# Patient Record
Sex: FEMALE | Race: WHITE | NOT HISPANIC OR LATINO | Employment: FULL TIME | URBAN - METROPOLITAN AREA
[De-identification: names, ages, dates, MRNs, and addresses within clinical notes are randomized per-mention and may not be internally consistent; named-entity substitution may affect disease eponyms.]

---

## 2017-01-11 ENCOUNTER — APPOINTMENT (OUTPATIENT)
Dept: WOMENS IMAGING | Facility: HOSPITAL | Age: 57
End: 2017-01-11

## 2017-01-11 PROCEDURE — 77067 SCR MAMMO BI INCL CAD: CPT | Performed by: RADIOLOGY

## 2018-02-08 ENCOUNTER — APPOINTMENT (OUTPATIENT)
Dept: WOMENS IMAGING | Facility: HOSPITAL | Age: 58
End: 2018-02-08

## 2018-02-08 PROCEDURE — 77067 SCR MAMMO BI INCL CAD: CPT | Performed by: RADIOLOGY

## 2019-02-22 ENCOUNTER — APPOINTMENT (OUTPATIENT)
Dept: WOMENS IMAGING | Facility: HOSPITAL | Age: 59
End: 2019-02-22

## 2019-02-22 PROCEDURE — 77067 SCR MAMMO BI INCL CAD: CPT | Performed by: RADIOLOGY

## 2020-06-05 ENCOUNTER — APPOINTMENT (OUTPATIENT)
Dept: WOMENS IMAGING | Facility: HOSPITAL | Age: 60
End: 2020-06-05

## 2020-06-05 PROCEDURE — 77067 SCR MAMMO BI INCL CAD: CPT | Performed by: RADIOLOGY

## 2022-01-07 ENCOUNTER — APPOINTMENT (OUTPATIENT)
Dept: WOMENS IMAGING | Facility: HOSPITAL | Age: 62
End: 2022-01-07

## 2022-01-07 PROCEDURE — 77067 SCR MAMMO BI INCL CAD: CPT | Performed by: RADIOLOGY

## 2022-01-07 PROCEDURE — 77063 BREAST TOMOSYNTHESIS BI: CPT | Performed by: RADIOLOGY

## 2022-02-02 ENCOUNTER — APPOINTMENT (OUTPATIENT)
Dept: WOMENS IMAGING | Facility: HOSPITAL | Age: 62
End: 2022-02-02

## 2022-02-02 PROCEDURE — 77065 DX MAMMO INCL CAD UNI: CPT | Performed by: RADIOLOGY

## 2022-02-02 PROCEDURE — 77061 BREAST TOMOSYNTHESIS UNI: CPT | Performed by: RADIOLOGY

## 2022-02-02 PROCEDURE — 76641 ULTRASOUND BREAST COMPLETE: CPT | Performed by: RADIOLOGY

## 2022-02-02 PROCEDURE — G0279 TOMOSYNTHESIS, MAMMO: HCPCS | Performed by: RADIOLOGY

## 2022-02-14 ENCOUNTER — APPOINTMENT (OUTPATIENT)
Dept: WOMENS IMAGING | Facility: HOSPITAL | Age: 62
End: 2022-02-14

## 2022-02-14 PROCEDURE — A4648 IMPLANTABLE TISSUE MARKER: HCPCS | Performed by: RADIOLOGY

## 2022-02-14 PROCEDURE — 19083 BX BREAST 1ST LESION US IMAG: CPT | Performed by: RADIOLOGY

## 2022-03-01 ENCOUNTER — NURSE NAVIGATOR (OUTPATIENT)
Dept: OTHER | Facility: HOSPITAL | Age: 62
End: 2022-03-01

## 2022-03-01 ENCOUNTER — OFFICE VISIT (OUTPATIENT)
Dept: SURGERY | Facility: CLINIC | Age: 62
End: 2022-03-01

## 2022-03-01 VITALS — BODY MASS INDEX: 31.72 KG/M2 | HEIGHT: 66 IN | WEIGHT: 197.4 LBS

## 2022-03-01 DIAGNOSIS — C50.919 MALIGNANT NEOPLASM OF FEMALE BREAST, UNSPECIFIED ESTROGEN RECEPTOR STATUS, UNSPECIFIED LATERALITY, UNSPECIFIED SITE OF BREAST: Primary | ICD-10-CM

## 2022-03-01 PROCEDURE — 99204 OFFICE O/P NEW MOD 45 MIN: CPT | Performed by: STUDENT IN AN ORGANIZED HEALTH CARE EDUCATION/TRAINING PROGRAM

## 2022-03-01 RX ORDER — ALPRAZOLAM 0.5 MG/1
0.5 TABLET ORAL 2 TIMES DAILY PRN
COMMUNITY
Start: 2022-02-12

## 2022-03-01 RX ORDER — GABAPENTIN 300 MG/1
1 CAPSULE ORAL 3 TIMES DAILY
Status: ON HOLD | COMMUNITY
Start: 2022-01-06 | End: 2022-04-01

## 2022-03-01 RX ORDER — LEVOTHYROXINE SODIUM 88 UG/1
88 TABLET ORAL DAILY
COMMUNITY
Start: 2022-02-10

## 2022-03-01 NOTE — PROGRESS NOTES
Referral received from Dr. Pinto's office. Met Ms. Tran, her daughter and  during her surgery consult. I introduced myself and navigational services. She has a good understanding of her pathology and treatment options presented to her by Dr. Pinto. After the consult she is leaning toward having a lumpectomy. She is comfortable with this plan and has no questions or concerns following the consult.     We discussed her support system and she stated she has wonderful support and no resource needs at this time.     We discussed integrative therapies and other services at the [facility]. I gave her a navigation folder with the following information: [facility] Cancer Support Network, Sharing Our Stories Breast Cancer Support Group, Cancer and Restorative Exercise (CARE), [facility], Together for Breast Cancer Survival, Guide for the Newly Diagnosed, Bioimpedance, [facility], Massage Therapy, Reiki Therapy, [employer] [place], Cancer Nutrition, and Survivorship Clinic.    She verbalized appreciation for navigational services and she has my contact information and will call with any questions that arise.

## 2022-03-02 ENCOUNTER — HOSPITAL ENCOUNTER (OUTPATIENT)
Dept: MRI IMAGING | Facility: HOSPITAL | Age: 62
Discharge: HOME | End: 2022-03-02
Admitting: STUDENT IN AN ORGANIZED HEALTH CARE EDUCATION/TRAINING PROGRAM

## 2022-03-02 DIAGNOSIS — C50.919 MALIGNANT NEOPLASM OF FEMALE BREAST, UNSPECIFIED ESTROGEN RECEPTOR STATUS, UNSPECIFIED LATERALITY, UNSPECIFIED SITE OF BREAST: ICD-10-CM

## 2022-03-02 PROCEDURE — 77049 MRI BREAST C-+ W/CAD BI: CPT

## 2022-03-02 PROCEDURE — 0 GADOBENATE DIMEGLUMINE 529 MG/ML SOLUTION: Performed by: STUDENT IN AN ORGANIZED HEALTH CARE EDUCATION/TRAINING PROGRAM

## 2022-03-02 PROCEDURE — A9577 INJ MULTIHANCE: HCPCS | Performed by: STUDENT IN AN ORGANIZED HEALTH CARE EDUCATION/TRAINING PROGRAM

## 2022-03-02 PROCEDURE — 82565 ASSAY OF CREATININE: CPT

## 2022-03-02 RX ADMIN — GADOBENATE DIMEGLUMINE 18 ML: 529 INJECTION, SOLUTION INTRAVENOUS at 11:20

## 2022-03-04 ENCOUNTER — TELEPHONE (OUTPATIENT)
Dept: SURGERY | Facility: CLINIC | Age: 62
End: 2022-03-04

## 2022-03-04 DIAGNOSIS — R91.8 LUNG MASS: Primary | ICD-10-CM

## 2022-03-04 NOTE — TELEPHONE ENCOUNTER
Discussed MRI results. No new findings in the breast/axilla. Plan for R breast wire localized lumpectomy/SLN biopsy.     Possible area of concern in the R lung with lymphadenopathy. Will obtain CT chest.     IMPRESSION AND RECOMMENDATION:  1.  Segmental 3.8 cm nonmass enhancement at 6:00 in the right breast  represents biopsy-proven malignancy. Surgical management is recommended.  2.  No MRI evidence of malignancy in the left breast.  3.  Suspected bilateral mediastinal and hilar lymphadenopathy and  incompletely assessed 1.3 cm nodular area of enhancement in the anterior  right lung. Recommend further evaluation with contrast-enhanced CT  chest.  BI-RADS Category 6: Known biopsy-proven malignancy    Sherrell Pinto MD

## 2022-03-07 ENCOUNTER — TELEPHONE (OUTPATIENT)
Dept: SURGERY | Facility: CLINIC | Age: 62
End: 2022-03-07

## 2022-03-07 NOTE — TELEPHONE ENCOUNTER
Hub staff attempted to follow warm transfer process and was unsuccessful     Caller: MAYURI VAUGHN    Relationship to patient: SHIRLEY    Best call back number: [phone]  Patient is needing: DAUGHTER MAYURI VAUGHN ALREADY SCHEDULED PT FOR A CTSCAN TODAY AT 11AM. SHE WILL FAX RESULTS WHEN SHE GETS THEM.   SHE WAS RETURNING A CALL FOR ZEFERINO OR JUVENCIO

## 2022-03-09 ENCOUNTER — TELEPHONE (OUTPATIENT)
Dept: SURGERY | Facility: CLINIC | Age: 62
End: 2022-03-09

## 2022-03-09 LAB — CREAT BLDA-MCNC: 1 MG/DL (ref 0.6–1.3)

## 2022-03-09 NOTE — TELEPHONE ENCOUNTER
Caller: MAYURI VAUGHN    Relationship: Child    Best call back number: [phone] (JUVENCIO'S #)    Caller requesting test results: CT SCAN    What test was performed: CT CHEST W/ CONTRAST    When was the test performed: 03/07/2022    Where was the test performed: [facility]    Additional notes: MAYURI VAUGHN (DAUGHTER) CALLED IN FOR HER MOTHER, SHE IS REQUESTING THAT HER MOTHER BE CALLED WITH THE TEST RESULTS OF THE CT SCAN SHE HAD DONE. PT IS AVAILABLE ANYTIME AND WOULD LIKE A VM IF SHE DOES NOT ANSWER.

## 2022-03-10 ENCOUNTER — PREP FOR SURGERY (OUTPATIENT)
Dept: OTHER | Facility: HOSPITAL | Age: 62
End: 2022-03-10

## 2022-03-10 ENCOUNTER — TELEPHONE (OUTPATIENT)
Dept: SURGERY | Facility: CLINIC | Age: 62
End: 2022-03-10

## 2022-03-10 DIAGNOSIS — C50.919 MALIGNANT NEOPLASM OF FEMALE BREAST, UNSPECIFIED ESTROGEN RECEPTOR STATUS, UNSPECIFIED LATERALITY, UNSPECIFIED SITE OF BREAST: Primary | ICD-10-CM

## 2022-03-10 RX ORDER — LIDOCAINE AND PRILOCAINE 25; 25 MG/G; MG/G
CREAM TOPICAL ONCE
Status: CANCELLED | OUTPATIENT
Start: 2022-04-01 | End: 2022-03-10

## 2022-03-10 RX ORDER — CLINDAMYCIN PHOSPHATE 900 MG/50ML
900 INJECTION INTRAVENOUS ONCE
Status: CANCELLED | OUTPATIENT
Start: 2022-04-01 | End: 2022-03-10

## 2022-03-10 RX ORDER — DIAZEPAM 5 MG/1
5 TABLET ORAL ONCE
Status: CANCELLED | OUTPATIENT
Start: 2022-04-01 | End: 2022-03-10

## 2022-03-10 NOTE — TELEPHONE ENCOUNTER
Caller: Fatoumata Tran    Relationship: Self    Best call back number: [phone]    What was the call regarding: PATIENT HAS SURGERY ON 04/01/22 AND WANTS TO KNOW HOW LONG SHE SHOULD EXPECT THE RECOVERY TIME TO BE AND HOW LONG SHE MAY BE OFF WORK FOR AFTER THE SURGERY.    Do you require a callback: YES

## 2022-03-10 NOTE — TELEPHONE ENCOUNTER
Called patient back. Informed her of the approx 4-6 week recovery for her lumpectomy. She has a sedentary job with [employer] so I explained that she would be okay to return to work shortly after surgery if she felt okay to do so. Otherwise she can remain off of work for the full duration of the 6 weeks. Advised that she wait until after surgery on 4/1 to make a decision on how long to take. Patient understood.

## 2022-03-20 ENCOUNTER — APPOINTMENT (OUTPATIENT)
Dept: CT IMAGING | Facility: HOSPITAL | Age: 62
End: 2022-03-20

## 2022-03-23 ENCOUNTER — NURSE NAVIGATOR (OUTPATIENT)
Dept: OTHER | Facility: HOSPITAL | Age: 62
End: 2022-03-23

## 2022-03-23 ENCOUNTER — TELEPHONE (OUTPATIENT)
Dept: SURGERY | Facility: CLINIC | Age: 62
End: 2022-03-23

## 2022-03-23 NOTE — TELEPHONE ENCOUNTER
Patient was reaching out to us concerning fmla paper work .     Patients phone number is correct in chart     She does see provider Jeana Pinto MD     Short term Fml[employer] paper work is through [employer]  . There is some documents scanned in to the chart

## 2022-03-23 NOTE — PROGRESS NOTES
Called Ms. Marc to see how she was doing. She stated she is anxious about her [facility] paperwork and wanted to check on the status of that. Sent Dr. Pinto's office a message and they will be in touch with her soon. Otherwise she stated she has no needs. She was thankful for the call and will reach out if any questions or needs arise.

## 2022-03-30 ENCOUNTER — PRE-ADMISSION TESTING (OUTPATIENT)
Dept: PREADMISSION TESTING | Facility: HOSPITAL | Age: 62
End: 2022-03-30

## 2022-03-30 VITALS
HEIGHT: 66 IN | SYSTOLIC BLOOD PRESSURE: 138 MMHG | RESPIRATION RATE: 20 BRPM | OXYGEN SATURATION: 99 % | HEART RATE: 68 BPM | WEIGHT: 193 LBS | DIASTOLIC BLOOD PRESSURE: 82 MMHG | TEMPERATURE: 97.9 F | BODY MASS INDEX: 31.02 KG/M2

## 2022-03-30 DIAGNOSIS — C50.919 MALIGNANT NEOPLASM OF FEMALE BREAST, UNSPECIFIED ESTROGEN RECEPTOR STATUS, UNSPECIFIED LATERALITY, UNSPECIFIED SITE OF BREAST: ICD-10-CM

## 2022-03-30 LAB
ANION GAP SERPL CALCULATED.3IONS-SCNC: 10 MMOL/L (ref 5–15)
BUN SERPL-MCNC: 17 MG/DL (ref 8–23)
BUN/CREAT SERPL: 16.5 (ref 7–25)
CALCIUM SPEC-SCNC: 9.8 MG/DL (ref 8.6–10.5)
CHLORIDE SERPL-SCNC: 101 MMOL/L (ref 98–107)
CO2 SERPL-SCNC: 30 MMOL/L (ref 22–29)
CREAT SERPL-MCNC: 1.03 MG/DL (ref 0.57–1)
DEPRECATED RDW RBC AUTO: 41.3 FL (ref 37–54)
EGFRCR SERPLBLD CKD-EPI 2021: 61.2 ML/MIN/1.73
ERYTHROCYTE [DISTWIDTH] IN BLOOD BY AUTOMATED COUNT: 12.4 % (ref 12.3–15.4)
GLUCOSE SERPL-MCNC: 80 MG/DL (ref 65–99)
HCT VFR BLD AUTO: 40.8 % (ref 34–46.6)
HGB BLD-MCNC: 14.2 G/DL (ref 12–15.9)
MCH RBC QN AUTO: 32 PG (ref 26.6–33)
MCHC RBC AUTO-ENTMCNC: 34.8 G/DL (ref 31.5–35.7)
MCV RBC AUTO: 91.9 FL (ref 79–97)
PLATELET # BLD AUTO: 246 10*3/MM3 (ref 140–450)
PMV BLD AUTO: 9.4 FL (ref 6–12)
POTASSIUM SERPL-SCNC: 4.5 MMOL/L (ref 3.5–5.2)
QT INTERVAL: 436 MS
RBC # BLD AUTO: 4.44 10*6/MM3 (ref 3.77–5.28)
SARS-COV-2 ORF1AB RESP QL NAA+PROBE: NOT DETECTED
SODIUM SERPL-SCNC: 141 MMOL/L (ref 136–145)
WBC NRBC COR # BLD: 5.68 10*3/MM3 (ref 3.4–10.8)

## 2022-03-30 PROCEDURE — 93010 ELECTROCARDIOGRAM REPORT: CPT | Performed by: INTERNAL MEDICINE

## 2022-03-30 PROCEDURE — 36415 COLL VENOUS BLD VENIPUNCTURE: CPT

## 2022-03-30 PROCEDURE — 80048 BASIC METABOLIC PNL TOTAL CA: CPT

## 2022-03-30 PROCEDURE — C9803 HOPD COVID-19 SPEC COLLECT: HCPCS

## 2022-03-30 PROCEDURE — 85027 COMPLETE CBC AUTOMATED: CPT

## 2022-03-30 PROCEDURE — 93005 ELECTROCARDIOGRAM TRACING: CPT

## 2022-03-30 PROCEDURE — U0004 COV-19 TEST NON-CDC HGH THRU: HCPCS

## 2022-04-01 ENCOUNTER — APPOINTMENT (OUTPATIENT)
Dept: GENERAL RADIOLOGY | Facility: HOSPITAL | Age: 62
End: 2022-04-01

## 2022-04-01 ENCOUNTER — ANESTHESIA EVENT (OUTPATIENT)
Dept: PERIOP | Facility: HOSPITAL | Age: 62
End: 2022-04-01

## 2022-04-01 ENCOUNTER — HOSPITAL ENCOUNTER (OUTPATIENT)
Dept: MAMMOGRAPHY | Facility: HOSPITAL | Age: 62
Discharge: HOME | End: 2022-04-01

## 2022-04-01 ENCOUNTER — HOSPITAL ENCOUNTER (OUTPATIENT)
Facility: HOSPITAL | Age: 62
Setting detail: HOSPITAL OUTPATIENT SURGERY
Discharge: HOME | End: 2022-04-01
Attending: STUDENT IN AN ORGANIZED HEALTH CARE EDUCATION/TRAINING PROGRAM | Admitting: STUDENT IN AN ORGANIZED HEALTH CARE EDUCATION/TRAINING PROGRAM

## 2022-04-01 ENCOUNTER — ANESTHESIA (OUTPATIENT)
Dept: PERIOP | Facility: HOSPITAL | Age: 62
End: 2022-04-01

## 2022-04-01 ENCOUNTER — HOSPITAL ENCOUNTER (OUTPATIENT)
Dept: NUCLEAR MEDICINE | Facility: HOSPITAL | Age: 62
Discharge: HOME | End: 2022-04-01

## 2022-04-01 VITALS
TEMPERATURE: 97.7 F | BODY MASS INDEX: 31.7 KG/M2 | DIASTOLIC BLOOD PRESSURE: 78 MMHG | SYSTOLIC BLOOD PRESSURE: 111 MMHG | WEIGHT: 196.43 LBS | HEART RATE: 61 BPM | OXYGEN SATURATION: 93 % | RESPIRATION RATE: 16 BRPM

## 2022-04-01 DIAGNOSIS — C50.919 MALIGNANT NEOPLASM OF FEMALE BREAST, UNSPECIFIED ESTROGEN RECEPTOR STATUS, UNSPECIFIED LATERALITY, UNSPECIFIED SITE OF BREAST: ICD-10-CM

## 2022-04-01 DIAGNOSIS — C50.919 MALIGNANT NEOPLASM OF FEMALE BREAST, UNSPECIFIED ESTROGEN RECEPTOR STATUS, UNSPECIFIED LATERALITY, UNSPECIFIED SITE OF BREAST: Primary | ICD-10-CM

## 2022-04-01 PROCEDURE — 19301 PARTIAL MASTECTOMY: CPT | Performed by: SPECIALIST/TECHNOLOGIST, OTHER

## 2022-04-01 PROCEDURE — 25010000002 ONDANSETRON PER 1 MG: Performed by: NURSE ANESTHETIST, CERTIFIED REGISTERED

## 2022-04-01 PROCEDURE — A9520 TC99 TILMANOCEPT DIAG 0.5MCI: HCPCS | Performed by: STUDENT IN AN ORGANIZED HEALTH CARE EDUCATION/TRAINING PROGRAM

## 2022-04-01 PROCEDURE — 25010000002 HYDROMORPHONE PER 4 MG: Performed by: NURSE ANESTHETIST, CERTIFIED REGISTERED

## 2022-04-01 PROCEDURE — 38792 RA TRACER ID OF SENTINL NODE: CPT

## 2022-04-01 PROCEDURE — 76098 X-RAY EXAM SURGICAL SPECIMEN: CPT

## 2022-04-01 PROCEDURE — 38525 BIOPSY/REMOVAL LYMPH NODES: CPT | Performed by: STUDENT IN AN ORGANIZED HEALTH CARE EDUCATION/TRAINING PROGRAM

## 2022-04-01 PROCEDURE — C1889 IMPLANT/INSERT DEVICE, NOC: HCPCS | Performed by: STUDENT IN AN ORGANIZED HEALTH CARE EDUCATION/TRAINING PROGRAM

## 2022-04-01 PROCEDURE — 88307 TISSUE EXAM BY PATHOLOGIST: CPT | Performed by: STUDENT IN AN ORGANIZED HEALTH CARE EDUCATION/TRAINING PROGRAM

## 2022-04-01 PROCEDURE — 25010000002 NEOSTIGMINE 5 MG/10ML SOLUTION: Performed by: NURSE ANESTHETIST, CERTIFIED REGISTERED

## 2022-04-01 PROCEDURE — 0 TECHETIUM TC99M TILMANOCEPT: Performed by: STUDENT IN AN ORGANIZED HEALTH CARE EDUCATION/TRAINING PROGRAM

## 2022-04-01 PROCEDURE — 38900 IO MAP OF SENT LYMPH NODE: CPT | Performed by: STUDENT IN AN ORGANIZED HEALTH CARE EDUCATION/TRAINING PROGRAM

## 2022-04-01 PROCEDURE — C1819 TISSUE LOCALIZATION-EXCISION: HCPCS

## 2022-04-01 PROCEDURE — 19301 PARTIAL MASTECTOMY: CPT | Performed by: STUDENT IN AN ORGANIZED HEALTH CARE EDUCATION/TRAINING PROGRAM

## 2022-04-01 PROCEDURE — 25010000002 PROPOFOL 10 MG/ML EMULSION: Performed by: NURSE ANESTHETIST, CERTIFIED REGISTERED

## 2022-04-01 PROCEDURE — 25010000002 MIDAZOLAM PER 1 MG: Performed by: ANESTHESIOLOGY

## 2022-04-01 PROCEDURE — 25010000002 DEXAMETHASONE PER 1 MG: Performed by: NURSE ANESTHETIST, CERTIFIED REGISTERED

## 2022-04-01 PROCEDURE — 0 LIDOCAINE 1 % SOLUTION: Performed by: STUDENT IN AN ORGANIZED HEALTH CARE EDUCATION/TRAINING PROGRAM

## 2022-04-01 PROCEDURE — 25010000002 FENTANYL CITRATE (PF) 50 MCG/ML SOLUTION: Performed by: NURSE ANESTHETIST, CERTIFIED REGISTERED

## 2022-04-01 DEVICE — LIGACLIP MCA MULTIPLE CLIP APPLIERS, 20 MEDIUM CLIPS
Type: IMPLANTABLE DEVICE | Site: BREAST | Status: FUNCTIONAL
Brand: LIGACLIP

## 2022-04-01 RX ORDER — FAMOTIDINE 10 MG/ML
20 INJECTION, SOLUTION INTRAVENOUS ONCE
Status: COMPLETED | OUTPATIENT
Start: 2022-04-01 | End: 2022-04-01

## 2022-04-01 RX ORDER — EPHEDRINE SULFATE 50 MG/ML
INJECTION, SOLUTION INTRAVENOUS AS NEEDED
Status: DISCONTINUED | OUTPATIENT
Start: 2022-04-01 | End: 2022-04-01 | Stop reason: SURG

## 2022-04-01 RX ORDER — HYDROMORPHONE HCL 110MG/55ML
PATIENT CONTROLLED ANALGESIA SYRINGE INTRAVENOUS AS NEEDED
Status: DISCONTINUED | OUTPATIENT
Start: 2022-04-01 | End: 2022-04-01 | Stop reason: SURG

## 2022-04-01 RX ORDER — FENTANYL CITRATE 50 UG/ML
INJECTION, SOLUTION INTRAMUSCULAR; INTRAVENOUS AS NEEDED
Status: DISCONTINUED | OUTPATIENT
Start: 2022-04-01 | End: 2022-04-01 | Stop reason: SURG

## 2022-04-01 RX ORDER — DIAZEPAM 5 MG/1
5 TABLET ORAL ONCE
Status: COMPLETED | OUTPATIENT
Start: 2022-04-01 | End: 2022-04-01

## 2022-04-01 RX ORDER — HYDROCODONE BITARTRATE AND ACETAMINOPHEN 7.5; 325 MG/1; MG/1
1 TABLET ORAL ONCE AS NEEDED
Status: COMPLETED | OUTPATIENT
Start: 2022-04-01 | End: 2022-04-01

## 2022-04-01 RX ORDER — ROCURONIUM BROMIDE 10 MG/ML
INJECTION, SOLUTION INTRAVENOUS AS NEEDED
Status: DISCONTINUED | OUTPATIENT
Start: 2022-04-01 | End: 2022-04-01 | Stop reason: SURG

## 2022-04-01 RX ORDER — SODIUM CHLORIDE, SODIUM LACTATE, POTASSIUM CHLORIDE, CALCIUM CHLORIDE 600; 310; 30; 20 MG/100ML; MG/100ML; MG/100ML; MG/100ML
9 INJECTION, SOLUTION INTRAVENOUS CONTINUOUS
Status: DISCONTINUED | OUTPATIENT
Start: 2022-04-01 | End: 2022-04-01 | Stop reason: HOSPADM

## 2022-04-01 RX ORDER — HYDRALAZINE HYDROCHLORIDE 20 MG/ML
5 INJECTION INTRAMUSCULAR; INTRAVENOUS
Status: DISCONTINUED | OUTPATIENT
Start: 2022-04-01 | End: 2022-04-01 | Stop reason: HOSPADM

## 2022-04-01 RX ORDER — DEXAMETHASONE SODIUM PHOSPHATE 10 MG/ML
INJECTION INTRAMUSCULAR; INTRAVENOUS AS NEEDED
Status: DISCONTINUED | OUTPATIENT
Start: 2022-04-01 | End: 2022-04-01 | Stop reason: SURG

## 2022-04-01 RX ORDER — ONDANSETRON 2 MG/ML
4 INJECTION INTRAMUSCULAR; INTRAVENOUS ONCE AS NEEDED
Status: DISCONTINUED | OUTPATIENT
Start: 2022-04-01 | End: 2022-04-01 | Stop reason: HOSPADM

## 2022-04-01 RX ORDER — SODIUM CHLORIDE 0.9 % (FLUSH) 0.9 %
3-10 SYRINGE (ML) INJECTION AS NEEDED
Status: DISCONTINUED | OUTPATIENT
Start: 2022-04-01 | End: 2022-04-01 | Stop reason: HOSPADM

## 2022-04-01 RX ORDER — SODIUM CHLORIDE, SODIUM LACTATE, POTASSIUM CHLORIDE, CALCIUM CHLORIDE 600; 310; 30; 20 MG/100ML; MG/100ML; MG/100ML; MG/100ML
100 INJECTION, SOLUTION INTRAVENOUS CONTINUOUS
Status: DISCONTINUED | OUTPATIENT
Start: 2022-04-01 | End: 2022-04-01 | Stop reason: HOSPADM

## 2022-04-01 RX ORDER — LABETALOL HYDROCHLORIDE 5 MG/ML
5 INJECTION, SOLUTION INTRAVENOUS
Status: DISCONTINUED | OUTPATIENT
Start: 2022-04-01 | End: 2022-04-01 | Stop reason: HOSPADM

## 2022-04-01 RX ORDER — IBUPROFEN 600 MG/1
600 TABLET ORAL ONCE AS NEEDED
Status: DISCONTINUED | OUTPATIENT
Start: 2022-04-01 | End: 2022-04-01 | Stop reason: HOSPADM

## 2022-04-01 RX ORDER — PROMETHAZINE HYDROCHLORIDE 25 MG/1
25 SUPPOSITORY RECTAL ONCE AS NEEDED
Status: DISCONTINUED | OUTPATIENT
Start: 2022-04-01 | End: 2022-04-01 | Stop reason: HOSPADM

## 2022-04-01 RX ORDER — HYDROMORPHONE HYDROCHLORIDE 1 MG/ML
0.5 INJECTION, SOLUTION INTRAMUSCULAR; INTRAVENOUS; SUBCUTANEOUS
Status: DISCONTINUED | OUTPATIENT
Start: 2022-04-01 | End: 2022-04-01 | Stop reason: HOSPADM

## 2022-04-01 RX ORDER — SODIUM CHLORIDE 0.9 % (FLUSH) 0.9 %
3 SYRINGE (ML) INJECTION EVERY 12 HOURS SCHEDULED
Status: DISCONTINUED | OUTPATIENT
Start: 2022-04-01 | End: 2022-04-01 | Stop reason: HOSPADM

## 2022-04-01 RX ORDER — IPRATROPIUM BROMIDE AND ALBUTEROL SULFATE 2.5; .5 MG/3ML; MG/3ML
3 SOLUTION RESPIRATORY (INHALATION) ONCE AS NEEDED
Status: DISCONTINUED | OUTPATIENT
Start: 2022-04-01 | End: 2022-04-01 | Stop reason: HOSPADM

## 2022-04-01 RX ORDER — MIDAZOLAM HYDROCHLORIDE 1 MG/ML
1 INJECTION INTRAMUSCULAR; INTRAVENOUS
Status: DISCONTINUED | OUTPATIENT
Start: 2022-04-01 | End: 2022-04-01 | Stop reason: HOSPADM

## 2022-04-01 RX ORDER — OXYCODONE AND ACETAMINOPHEN 7.5; 325 MG/1; MG/1
1 TABLET ORAL EVERY 4 HOURS PRN
Status: DISCONTINUED | OUTPATIENT
Start: 2022-04-01 | End: 2022-04-01 | Stop reason: HOSPADM

## 2022-04-01 RX ORDER — FLUMAZENIL 0.1 MG/ML
0.2 INJECTION INTRAVENOUS AS NEEDED
Status: DISCONTINUED | OUTPATIENT
Start: 2022-04-01 | End: 2022-04-01 | Stop reason: HOSPADM

## 2022-04-01 RX ORDER — HYDROCODONE BITARTRATE AND ACETAMINOPHEN 5; 325 MG/1; MG/1
1 TABLET ORAL EVERY 6 HOURS PRN
Qty: 8 TABLET | Refills: 0 | Status: SHIPPED | OUTPATIENT
Start: 2022-04-01 | End: 2022-04-13 | Stop reason: SDUPTHER

## 2022-04-01 RX ORDER — BUPIVACAINE HYDROCHLORIDE AND EPINEPHRINE 5; 5 MG/ML; UG/ML
INJECTION, SOLUTION PERINEURAL AS NEEDED
Status: DISCONTINUED | OUTPATIENT
Start: 2022-04-01 | End: 2022-04-01 | Stop reason: HOSPADM

## 2022-04-01 RX ORDER — ONDANSETRON 2 MG/ML
INJECTION INTRAMUSCULAR; INTRAVENOUS AS NEEDED
Status: DISCONTINUED | OUTPATIENT
Start: 2022-04-01 | End: 2022-04-01 | Stop reason: SURG

## 2022-04-01 RX ORDER — PROMETHAZINE HYDROCHLORIDE 25 MG/1
25 TABLET ORAL ONCE AS NEEDED
Status: DISCONTINUED | OUTPATIENT
Start: 2022-04-01 | End: 2022-04-01 | Stop reason: HOSPADM

## 2022-04-01 RX ORDER — CLINDAMYCIN PHOSPHATE 900 MG/50ML
900 INJECTION INTRAVENOUS ONCE
Status: COMPLETED | OUTPATIENT
Start: 2022-04-01 | End: 2022-04-01

## 2022-04-01 RX ORDER — GABAPENTIN 600 MG/1
600 TABLET ORAL 3 TIMES DAILY
COMMUNITY
Start: 2022-03-05

## 2022-04-01 RX ORDER — LIDOCAINE HYDROCHLORIDE 20 MG/ML
INJECTION, SOLUTION INFILTRATION; PERINEURAL AS NEEDED
Status: DISCONTINUED | OUTPATIENT
Start: 2022-04-01 | End: 2022-04-01 | Stop reason: SURG

## 2022-04-01 RX ORDER — EPHEDRINE SULFATE 50 MG/ML
5 INJECTION, SOLUTION INTRAVENOUS ONCE AS NEEDED
Status: DISCONTINUED | OUTPATIENT
Start: 2022-04-01 | End: 2022-04-01 | Stop reason: HOSPADM

## 2022-04-01 RX ORDER — PROPOFOL 10 MG/ML
VIAL (ML) INTRAVENOUS AS NEEDED
Status: DISCONTINUED | OUTPATIENT
Start: 2022-04-01 | End: 2022-04-01 | Stop reason: SURG

## 2022-04-01 RX ORDER — FENTANYL CITRATE 50 UG/ML
50 INJECTION, SOLUTION INTRAMUSCULAR; INTRAVENOUS
Status: DISCONTINUED | OUTPATIENT
Start: 2022-04-01 | End: 2022-04-01 | Stop reason: HOSPADM

## 2022-04-01 RX ORDER — LIDOCAINE AND PRILOCAINE 25; 25 MG/G; MG/G
CREAM TOPICAL ONCE
Status: COMPLETED | OUTPATIENT
Start: 2022-04-01 | End: 2022-04-01

## 2022-04-01 RX ORDER — DIPHENHYDRAMINE HYDROCHLORIDE 50 MG/ML
12.5 INJECTION INTRAMUSCULAR; INTRAVENOUS
Status: DISCONTINUED | OUTPATIENT
Start: 2022-04-01 | End: 2022-04-01 | Stop reason: HOSPADM

## 2022-04-01 RX ORDER — GLYCOPYRROLATE 0.2 MG/ML
INJECTION INTRAMUSCULAR; INTRAVENOUS AS NEEDED
Status: DISCONTINUED | OUTPATIENT
Start: 2022-04-01 | End: 2022-04-01 | Stop reason: SURG

## 2022-04-01 RX ORDER — NEOSTIGMINE METHYLSULFATE 0.5 MG/ML
INJECTION, SOLUTION INTRAVENOUS AS NEEDED
Status: DISCONTINUED | OUTPATIENT
Start: 2022-04-01 | End: 2022-04-01 | Stop reason: SURG

## 2022-04-01 RX ORDER — MAGNESIUM HYDROXIDE 1200 MG/15ML
LIQUID ORAL AS NEEDED
Status: DISCONTINUED | OUTPATIENT
Start: 2022-04-01 | End: 2022-04-01 | Stop reason: HOSPADM

## 2022-04-01 RX ORDER — DIPHENHYDRAMINE HCL 25 MG
25 CAPSULE ORAL
Status: DISCONTINUED | OUTPATIENT
Start: 2022-04-01 | End: 2022-04-01 | Stop reason: HOSPADM

## 2022-04-01 RX ORDER — NALOXONE HCL 0.4 MG/ML
0.2 VIAL (ML) INJECTION AS NEEDED
Status: DISCONTINUED | OUTPATIENT
Start: 2022-04-01 | End: 2022-04-01 | Stop reason: HOSPADM

## 2022-04-01 RX ORDER — LIDOCAINE HYDROCHLORIDE 10 MG/ML
6 INJECTION, SOLUTION INFILTRATION; PERINEURAL ONCE
Status: COMPLETED | OUTPATIENT
Start: 2022-04-01 | End: 2022-04-01

## 2022-04-01 RX ORDER — LIDOCAINE HYDROCHLORIDE 10 MG/ML
0.5 INJECTION, SOLUTION EPIDURAL; INFILTRATION; INTRACAUDAL; PERINEURAL ONCE AS NEEDED
Status: DISCONTINUED | OUTPATIENT
Start: 2022-04-01 | End: 2022-04-01 | Stop reason: HOSPADM

## 2022-04-01 RX ADMIN — Medication 6 ML: at 08:42

## 2022-04-01 RX ADMIN — LIDOCAINE AND PRILOCAINE: 25; 25 CREAM TOPICAL at 07:07

## 2022-04-01 RX ADMIN — FAMOTIDINE 20 MG: 10 INJECTION INTRAVENOUS at 09:29

## 2022-04-01 RX ADMIN — EPHEDRINE SULFATE 10 MG: 50 INJECTION INTRAVENOUS at 10:13

## 2022-04-01 RX ADMIN — CLINDAMYCIN PHOSPHATE 900 MG: 900 INJECTION, SOLUTION INTRAVENOUS at 09:33

## 2022-04-01 RX ADMIN — TILMANOCEPT 1 DOSE: KIT at 09:00

## 2022-04-01 RX ADMIN — GLYCOPYRROLATE 0.4 MG: 0.2 INJECTION INTRAMUSCULAR; INTRAVENOUS at 11:09

## 2022-04-01 RX ADMIN — ONDANSETRON 4 MG: 2 INJECTION INTRAMUSCULAR; INTRAVENOUS at 11:09

## 2022-04-01 RX ADMIN — HYDROMORPHONE HYDROCHLORIDE 0.5 MG: 2 INJECTION, SOLUTION INTRAMUSCULAR; INTRAVENOUS; SUBCUTANEOUS at 10:56

## 2022-04-01 RX ADMIN — EPHEDRINE SULFATE 5 MG: 50 INJECTION INTRAVENOUS at 10:06

## 2022-04-01 RX ADMIN — PROPOFOL 200 MG: 10 INJECTION, EMULSION INTRAVENOUS at 09:57

## 2022-04-01 RX ADMIN — FENTANYL CITRATE 100 MCG: 0.05 INJECTION, SOLUTION INTRAMUSCULAR; INTRAVENOUS at 09:57

## 2022-04-01 RX ADMIN — NEOSTIGMINE METHYLSULFATE 3 MG: 0.5 INJECTION INTRAVENOUS at 11:09

## 2022-04-01 RX ADMIN — MIDAZOLAM 1 MG: 1 INJECTION INTRAMUSCULAR; INTRAVENOUS at 09:29

## 2022-04-01 RX ADMIN — LIDOCAINE HYDROCHLORIDE 100 MG: 20 INJECTION, SOLUTION INFILTRATION; PERINEURAL at 09:57

## 2022-04-01 RX ADMIN — HYDROMORPHONE HYDROCHLORIDE 0.5 MG: 2 INJECTION, SOLUTION INTRAMUSCULAR; INTRAVENOUS; SUBCUTANEOUS at 11:11

## 2022-04-01 RX ADMIN — DEXAMETHASONE SODIUM PHOSPHATE 8 MG: 10 INJECTION INTRAMUSCULAR; INTRAVENOUS at 10:06

## 2022-04-01 RX ADMIN — ROCURONIUM BROMIDE 40 MG: 50 INJECTION INTRAVENOUS at 09:57

## 2022-04-01 RX ADMIN — HYDROMORPHONE HYDROCHLORIDE 0.5 MG: 1 INJECTION, SOLUTION INTRAMUSCULAR; INTRAVENOUS; SUBCUTANEOUS at 14:40

## 2022-04-01 RX ADMIN — SODIUM CHLORIDE, POTASSIUM CHLORIDE, SODIUM LACTATE AND CALCIUM CHLORIDE 9 ML/HR: 600; 310; 30; 20 INJECTION, SOLUTION INTRAVENOUS at 09:29

## 2022-04-01 RX ADMIN — EPHEDRINE SULFATE 10 MG: 50 INJECTION INTRAVENOUS at 10:11

## 2022-04-01 RX ADMIN — HYDROCODONE BITARTRATE AND ACETAMINOPHEN 1 TABLET: 7.5; 325 TABLET ORAL at 12:44

## 2022-04-01 RX ADMIN — DIAZEPAM 5 MG: 5 TABLET ORAL at 07:07

## 2022-04-01 RX ADMIN — FENTANYL CITRATE 50 MCG: 50 INJECTION INTRAMUSCULAR; INTRAVENOUS at 12:42

## 2022-04-01 NOTE — ANESTHESIA POSTPROCEDURE EVALUATION
Patient: Fatoumata Tran    Procedure Summary     Date: 04/01/22 Room / Location: [facility] OR 03 / [facility] MAIN OR    Anesthesia Start: 0946 Anesthesia Stop: 1132    Procedure: RIGHT BREAST WIRE BRACKETED LUMPECTOMY WITH SENTINEL NODE BIOPSY (Right Breast) Diagnosis:       Malignant neoplasm of female breast, unspecified estrogen receptor status, unspecified laterality, unspecified site of breast (HCC)      (Malignant neoplasm of female breast, unspecified estrogen receptor status, unspecified laterality, unspecified site of breast (HCC) [C50.919])    Surgeons: Sherrell Pinto MD Provider: Errol Zamora MD    Anesthesia Type: general ASA Status: 2          Anesthesia Type: general    Vitals  Vitals Value Taken Time   /75 04/01/22 1335   Temp 36.5 °C (97.7 °F) 04/01/22 1127   Pulse 56 04/01/22 1342   Resp 20 04/01/22 1335   SpO2 94 % 04/01/22 1342           Post Anesthesia Care and Evaluation    Patient location during evaluation: bedside  Patient participation: complete - patient participated  Level of consciousness: awake and alert  Pain management: adequate  Airway patency: patent  Anesthetic complications: No anesthetic complications  PONV Status: none  Cardiovascular status: acceptable  Respiratory status: acceptable  Hydration status: acceptable    Comments: /73   Pulse 66   Temp 36.5 °C (97.7 °F) (Oral)   Resp 16   Wt 89.1 kg (196 lb 6.9 oz)   SpO2 95%   BMI 31.70 kg/m²

## 2022-04-01 NOTE — H&P
General Surgery Breast Cancer History and Physical Exam      Summary:    Fatoumata Tran is a 61 y.o. lady. The patient presents with a new diagnosis of right breast invasive ductal carcinoma: Grade 1, ER+/SC+, HER2 -, cT1N0, anatomic stage 1a and prognostic stage 1a.       A multidisciplinary plan has been formulated for the patient:    (1) Breast Surgical Oncology:  -No indication for preoperative indicated genetic testing.  -MRI planned for tomorrow.   -Nurse navigator consult.   -Surgical plan: We will plan for right breast Saviguided lumpectomy with lymph node biopsy, pending MRI results.  -Plastic surgery referral deferred.      (2) Medical Oncology:  -Will refer postoperatively for evaluation for endocrine therapy and possible need for chemotherapy.     (3) Radiation Oncology:  -Will refer postoperatively for evaluation for radiation therapy.     Referring Provider: No ref. provider found     Chief Complaint: abnormal breast imaging     History of Present Illness: Ms. Fatoumata Tran is a 61 y.o. year old lady, seen at the request of No ref. provider found for a new diagnosis of right breast cancer.       This was initially detected as an imaging abnormality. She has had annual mammograms each year. She has a prior history of an abnormal mammogram on the right side that required her to have a diagnostic mammogram and ultrasound, which was normal. Her work-up is detailed in the oncologic history below.      She denies any breast lumps, pain, skin changes, or nipple discharge. She has a family history of breast cancer in her maternal aunt and cousin. She denies any family history of ovarian cancer.       Workup of Current Diagnosis:       01/07/2022 Bilateral Screening Mammogram:  There is a focal asymmetry in the posterior one third of the lower inner right breast at 5 o'clock.  BI-RADS Category 0: Incomplete.     02/02/2022 Right Breast Diagnostic Mammogram with Ultrasound:   There is a focal asymmetry with  architectural distortion in the right breast at 5 o'clock, 4 cm from the nipple. On ultrasound, there is a 9 x 6 x 9 mm mass. Suspicious, ultrasound-guided biopsy is recommended.  BI-RADS Category 4c: Suspicious abnormality.     02/14/2022 Right Breast Ultrasound-Guided Biopsy:   The right breast at 5 o'clock was imaged and the mass was localized. Ten cores were taken and a mini cork tissue marker was placed.     02/14/2022 Pathology:   Right breast, 5 o'clock, ultrasound-guided biopsy:  Invasive ductal carcinoma, grade 1. 5.5 mm largest focus. ER+; WA+: HER2-; Ki-67, 13 percent.      MRI scheduled for 03/02/2022.     Gynecologic History:   G:3 P:2 AB:0  Age at first childbirth: 23  Lactation/How long: Not applicable.  Age at menarche: 10  Age at menopause: Late 50s  Total years of oral contraceptive use: Over 10 years  Total years of hormone replacement therapy: Not applicable.     Past Medical History:   · Peripheral neuropathy, bilateral feet.      Past Surgical History:    · Hysterectomy  · Total knee arthroscopy     Family History:    · As above.      Social History:  · Denies tobacco use  · Occasional alcohol use     Allergies:   Allergies   Allergen Reactions   • Penicillins Hives, Itching and Rash   • Sulfa Antibiotics Hives, Itching and Rash         Medications:      Current Outpatient Medications:   •  ALPRAZolam (XANAX) 0.5 MG tablet, Take 0.5 mg by mouth 2 (Two) Times a Day As Needed., Disp: , Rfl:   •  gabapentin (NEURONTIN) 300 MG capsule, Take 2 capsules by mouth 3 (Three) Times a Day., Disp: , Rfl:   •  levothyroxine (SYNTHROID, LEVOTHROID) 88 MCG tablet, Take 88 mcg by mouth Daily., Disp: , Rfl:      Laboratory Values:    Labs from 01/28/2022 reviewed.     Review of Systems:   Influenza-like illness: no fever, no  cough, no  sore throat, no  body aches, no loss of sense of taste or smell, no known exposure to person with Covid-19.  Constitutional: Negative for fevers or chills  HENT: Negative for  hearing loss or runny nose  Eyes: Negative for vision changes or scleral icterus  Respiratory: Negative for cough or shortness of breath  Cardiovascular: Negative for chest pain or heart palpitations  Gastrointestinal: Negative for abdominal pain, nausea, vomiting, constipation, melena, or hematochezia  Genitourinary: Negative for hematuria or dysuria  Musculoskeletal: Negative for joint swelling or gait instability  Neurologic: Negative for tremors or seizures  Psychiatric: Negative for suicidal ideations or depression  All other systems reviewed and negative     Physical Exam:   · ECO - Symptomatic but completely ambulatory  · Constitutional: Well-developed well-nourished, no acute distress  · Eyes: Conjunctiva normal, sclera nonicteric  · ENMT: Hearing grossly normal, oral mucosa moist  · Neck: Supple, no palpable mass, trachea midline  · Respiratory: Clear to auscultation, normal inspiratory effort  · Cardiovascular: Regular rate, no peripheral edema, no jugular venous distention  · Breast: symmetric.  ? Right: No visible abnormalities on inspection while seated, with arms raised or hands on hips. No masses, skin changes, or nipple abnormalities.  ? Left: No visible abnormalities on inspection while seated, with arms raised or hands on hips. No masses, skin changes, or nipple abnormalities.  ? Biopsy site appreciated in the lower mid right breast with no hematoma.   ? No clinical chest wall involvement.  · Gastrointestinal: Soft, nontender  · Lymphatics (palpable nodes): No cervical, supraclavicular or axillary lymphadenopathy  · Skin:  Warm, dry, no rash on visualized skin surfaces  · Musculoskeletal: Symmetric strength, normal gait  · Psychiatric: Alert and oriented ×3, normal affect      Discussion:  I had an extensive discussion with the patient and her family about the nature of her breast cancer diagnosis. We reviewed the components of breast tissue including ducts and lobules. We reviewed her  pathology report in detail. We reviewed breast cancer histology, including stage, grade, ER/NM receptors, HER2 receptors and how this applies to her diagnosis. We reviewed the basics of systemic and local/regional management of breast cancer.      We discussed that most breast cancer is not hereditary, that I do not believe this plays a role in her case, and that genetic testing is not warranted.       We reviewed potential surgical treatments to include partial mastectomy, mastectomy, sentinel lymph node biopsy and axillary node dissection and discussed the rationale associated with each approach. Regarding radiation therapy, we discussed that radiation is indicated in all cases of breast conservation and in only limited circumstances following mastectomy. We discussed that the primary goal of adjuvant radiation is to decrease the likelihood of local recurrence.      In her case, she is a good candidate for lumpectomy and she would like to proceed with breast conservation. We discussed that lumpectomy would require preoperative wire-localization. We also discussed the risk of positive margins and that she must have negative margins for lumpectomy to be an appropriate oncologic procedure. I will make every effort to obtain negative margins at her initial operation, but there is a 10-15% chance that she will require a second operation for re-excision, or possibly a total mastectomy. We will not know the margin status until after her final pathology has returned.      We discussed axillary staging. I described the procedure for sentinel lymph node biopsy in detail, including the preoperative injection of technetium sulfur colloid and intraoperative injection of lymphazurin blue dye. I explained that this is a mapping test and not a cancer test, that all of the lymph nodes containing these dyes will be removed for complete testing by pathology, and that the results could impact the decision for adjuvant treatment or  additional surgery.     I described additional risks and potential complications associated with surgery, including, but not limited to, bleeding, infection, complications related to blue dye, lymphedema, deformity/poor cosmetic result, chronic pain, neurovascular injury, numbness, seroma, hematoma, deep venous thrombosis, skin flap necrosis, disease recurrence and the possibility of requiring additional surgery. We also discussed other treatment options including the option of not undergoing any surgical treatment and the risks associated with this including disease progression. She expressed an understanding of these factors and wished to proceed.     We discussed that in her case, systemic treatment would involve endocrine therapy and possibly chemotherapy. She will be referred to medical oncology postoperatively to discuss this further.      JAG SMITH M.D.  General and Endoscopic Surgery  [facility] [place]     [address], [address]  [place], [place], [address]  P: [phone]  F: [phone]

## 2022-04-01 NOTE — ANESTHESIA PROCEDURE NOTES
Airway  Urgency: elective    Date/Time: 4/1/2022 9:59 AM  Airway not difficult    General Information and Staff    Patient location during procedure: OR  Anesthesiologist: Errol Zamora MD  CRNA: Lilli Llanes CRNA    Indications and Patient Condition  Indications for airway management: airway protection    Preoxygenated: yes  MILS not maintained throughout  Mask difficulty assessment: 1 - vent by mask    Final Airway Details  Final airway type: endotracheal airway      Successful airway: ETT  Cuffed: yes   Successful intubation technique: direct laryngoscopy  Facilitating devices/methods: intubating stylet  Endotracheal tube insertion site: oral  Blade: Olivia  Blade size: 3  ETT size (mm): 7.0  Cormack-Lehane Classification: grade I - full view of glottis  Placement verified by: chest auscultation   Cuff volume (mL): 8  Measured from: lips  Number of attempts at approach: 1  Assessment: lips, teeth, and gum same as pre-op and atraumatic intubation    Additional Comments  PreO2 100% face mask, IV induction, easy mask, DVL x1, cords noted, tube through, cuff up, EBBSH, +etCO2, = chest movement, tube secured in place, atraumatic, teeth and lips intact as preop.

## 2022-04-01 NOTE — ANESTHESIA PREPROCEDURE EVALUATION
Anesthesia Evaluation     Patient summary reviewed   NPO Solid Status: > 8 hours  NPO Liquid Status: > 2 hours           Airway   Mallampati: I  TM distance: >3 FB  Neck ROM: full  Dental      Pulmonary     breath sounds clear to auscultation  (-) shortness of breath, not a smoker  Cardiovascular   Exercise tolerance: good (4-7 METS)    Rhythm: regular  Rate: normal    (-) angina, AGUSTIN      Neuro/Psych  GI/Hepatic/Renal/Endo    (+) obesity,   thyroid problem hypothyroidism    ROS Comment: Denies Parathyroid disease     Musculoskeletal     Abdominal    Substance History      OB/GYN          Other      history of cancer (breast)                    Anesthesia Plan    ASA 2     general   (I have explained the risks of anesthesia to the patient and/or patient representative including but not limited to dental damage, corneal abrasion, nerve injury, MI, stroke, and death. All patient questions were asked and answered. Anesthetic plan discussed with patient and team as indicated including risks, benefits, and alternatives. Patient expressed understanding of plan and agreed to proceed.)  intravenous induction     Anesthetic plan, all risks, benefits, and alternatives have been provided, discussed and informed consent has been obtained with: patient.        CODE STATUS:

## 2022-04-04 ENCOUNTER — TELEPHONE (OUTPATIENT)
Dept: SURGERY | Facility: CLINIC | Age: 62
End: 2022-04-04

## 2022-04-04 ENCOUNTER — NURSE NAVIGATOR (OUTPATIENT)
Dept: OTHER | Facility: HOSPITAL | Age: 62
End: 2022-04-04

## 2022-04-04 DIAGNOSIS — C50.919 MALIGNANT NEOPLASM OF FEMALE BREAST, UNSPECIFIED ESTROGEN RECEPTOR STATUS, UNSPECIFIED LATERALITY, UNSPECIFIED SITE OF BREAST: Primary | ICD-10-CM

## 2022-04-04 RX ORDER — HYDROCODONE BITARTRATE AND ACETAMINOPHEN 5; 325 MG/1; MG/1
TABLET ORAL
Qty: 24 TABLET | Refills: 0 | Status: SHIPPED | OUTPATIENT
Start: 2022-04-04

## 2022-04-04 NOTE — TELEPHONE ENCOUNTER
I spoke with the patient and she is aware of refill sent to her pharmacy. No need for you to call her.

## 2022-04-04 NOTE — TELEPHONE ENCOUNTER
Patient is requesting a refill of Hydrocodone 5-325, 1 tab po q 6 prn, S/P RIGHT BREAST WIRE BRACKETED LUMPECTOMY WITH SENTINEL NODE BIOPSY on 4/1.

## 2022-04-04 NOTE — PROGRESS NOTES
Called Ms. Tran to see how she was doing. She stated she is still in a lot of pain and is not doing well. She requested a refill on pain medications from Dr. Pinto's office. Will follow up with office to see is that is doable. She also had questions about her post op appt. Let her know someone would contact her about that in the next few days. She was thankful for the help. She was thankful for the call and will reach out if any questions or needs arise.

## 2022-04-04 NOTE — TELEPHONE ENCOUNTER
Provider: DR. SMITH    Caller: JUVENCIO LE    Relationship to Patient: SELF     Pharmacy:[id number] [address] [place] [address] RD AT [facility]      Phone Number: [phone]    Reason for Call: PT CALLING TO ADVISE SHE HAD SURGERY ON 04.01.22 W/ DR. SMITH FOR RIGHT BREAST WIRE BRACKETED LUMPECTOMY W/ SENTINEL NODE BIOPSY.   PT STATED DR. SMITH PRESCRIBED 8 TABLETS OF HYDROcodone. PT IS SUPPOSED TO TAKE 1 TABLET EVERY 6 HOURS.   PATIENT ADVISED SHE IS IN VERY BAD PAIN AND ASKED IF POSSIBLE FOR A COUPLE MORE TABLETS TO BE PRESCRIBED? SHE FEELS 8 MAY NOT BE ENOUGH DUE TO THE SURGERY SHE HAD ON Friday.   PATIENT IS OKAY WITH A CALL BACK.

## 2022-04-07 NOTE — OP NOTE
OPERATIVE REPORT     DATE: 4/1/2022     SURGEON: Sherrell Pinto MD      ASSISTANT: Patricia Villa, who was present for necessary suctioning, retracting, suturing throughout the procedure      OPERATION PERFORMED: right  breast wire bracketed lumpectomy with sentinel lymph node biopsy     PREOPERATIVE DIAGNOSIS: invasive carcinoma of the right breast      POSTOPERATIVE DIAGNOSIS: Same     ANESTHESIA: general     SPECIMEN:   right  breast wire localized lumpectomy (short stitch superior, long lateral, double anterior)  Additional anterior margin (stitch marks true margin)  Additional lateral margin (stitch marks true margin)  Additional superior margin (stitch marks true margin)  Additional inferior margin (stitch marks true margin)  Additional medial margin (stitch marks true margin)  Additional posterior margin (stitch marks true margin)    right axillary sentinel lymph node #1 (hot, blue, 85)  right axillary sentinel lymph node #2 (hot, blue, 1850)  right axillary sentinel lymph node #3 (hot, blue, 520)  Right axillary sentinel lymph node #4 (palpable)  Right axillary sentinel lymph node #5 (hot, 110)    DRAINS: None     BLOOD LOSS: Minimal     INDICATION FOR OPERATION:Fatoumata Tran is a 61 y.o. lady who was recently diagnosed with right breast invasive ductal carcinoma.    Right breast wire bracketed lumpectomy with sentinel lymph node biopsy was recommended. All risks (including bleeding, infection, damage to surrounding structures, need for further surgery, pathologic upgrade), benefits and alternatives were explained to the patient who agreed and wished to proceed. Informed consent was signed.      OPERATIVE COURSE: The patient was taken to the operating room, transferred onto the operating room table, and underwent anesthesia without incident. 5 cc of blue dye was injected into the dermal layer of the nipple areolar complex. The patient was prepped and draped in sterile fashion. A time out was performed and  preoperative antibiotics were given.  Half percent Marcaine with epinephrine was injected into the skin and subcutaneous tissues.  A curvilinear incision was made along the breast from the 3 to 6 o'clock position.  Bovie electrocautery was used to create a flap along the length of the wire 1 cm in thickness.    The tissue was transected medially and laterally along the length of the wire.  The superior border was transected just past the tip of both wires. Lastly the wire was brought through the incision with 2 hemostats.    The inferior margin was then transected. The tissue was amputated at its base.  It was marked as listed above.  Specimen radiograph confirmed clip, wire, and abnormal breast tissue.  It was sent for fresh permanent specimen. Additional margins were taken along all borders. These were done with Allis clamps and approximately 1 cm in thickness. The area was irrigated and appeared hemostatic.  There were no signs of bleeding.      We then performed a sentinel lymph node biopsy. A curvilinear incision was made just inferior to the hairbearing area of the right axilla. Bovie electrocautery was used to dissect down through the skin and subcutaneous tissues and through the clavipectoral fascia into the axilla.  5 Royal lymph nodes were identified. These were removed with Bovie electrocautery and clips across all major lymphovascular structures. Once this was done, there were no hot, blue, or palpable lymph nodes remaining. The area was irrigated and appeared hemostatic.    The rest of the local anesthetic was administered. It was closed with interrupted 3-0 Vicryl sutures and a running 4-0 Monocryl suture.  Skin glue was placed over the incision.  The patient tolerated the procedure well. All needle and lap counts were correct at the end of the case. The patient was then awoken from anesthesia and taken to recovery for further monitoring.         Sherrell Pinto MD   General and Endoscopic  Surgery  [facility]     [address], [address]  [place], [place], [address]  P: [phone]  F: [phone]

## 2022-04-08 ENCOUNTER — TELEPHONE (OUTPATIENT)
Dept: SURGERY | Facility: CLINIC | Age: 62
End: 2022-04-08

## 2022-04-08 DIAGNOSIS — C50.919 MALIGNANT NEOPLASM OF FEMALE BREAST, UNSPECIFIED ESTROGEN RECEPTOR STATUS, UNSPECIFIED LATERALITY, UNSPECIFIED SITE OF BREAST: Primary | ICD-10-CM

## 2022-04-08 NOTE — TELEPHONE ENCOUNTER
Pt calling about her path results from the lump and node biopsy surgery. She is upset that she was told she would be called Tuesday or Wednesday with path results. She has called the office multiple times for an apt and the results. She has not been happy with her care from Dr. Pinto or the office.

## 2022-04-11 ENCOUNTER — TELEPHONE (OUTPATIENT)
Dept: SURGERY | Facility: CLINIC | Age: 62
End: 2022-04-11

## 2022-04-11 NOTE — TELEPHONE ENCOUNTER
----- Message from Sherrell Pinto MD sent at 4/8/2022  6:55 PM EDT -----  Regarding: Oncotype Dx  Can you please send an Oncotype Dx?   Thanks,   Sherrell

## 2022-04-13 ENCOUNTER — OFFICE VISIT (OUTPATIENT)
Dept: SURGERY | Facility: CLINIC | Age: 62
End: 2022-04-13

## 2022-04-13 ENCOUNTER — TELEPHONE (OUTPATIENT)
Dept: SURGERY | Facility: CLINIC | Age: 62
End: 2022-04-13

## 2022-04-13 DIAGNOSIS — C50.911 MALIGNANT NEOPLASM OF RIGHT FEMALE BREAST, UNSPECIFIED ESTROGEN RECEPTOR STATUS, UNSPECIFIED SITE OF BREAST: Primary | ICD-10-CM

## 2022-04-13 PROCEDURE — 99024 POSTOP FOLLOW-UP VISIT: CPT | Performed by: STUDENT IN AN ORGANIZED HEALTH CARE EDUCATION/TRAINING PROGRAM

## 2022-04-13 NOTE — TELEPHONE ENCOUNTER
Caller: JUVENCIO LE  Relationship to Patient: SELF  Phone Number: [phone]  Reason for Call: DR. SMITH IS REFERRING PT TO DR. KARUNA MARSHALL. DR. MARSHALL'S OFFICE TOLD PT THAT THEY RECEIVED THE OFFICE NOTES BUT DID NOT RECEIVE THE REFERRAL YET AND ARE UNABLE TO SCHEDULE AN APPT FOR PT UNTIL REFERRAL IS RECEIVED.

## 2022-04-13 NOTE — PROGRESS NOTES
General Surgery Breast Cancer History and Physical Exam     Summary:    Fatoumata Tran is a 61 y.o. lady. The patient presents with a history of right breast invasive ductal carcinoma with DCIS: Grade II, ER+/VT+, HER2 -, pT2N0.      A multidisciplinary plan has been formulated for the patient:    (1) Breast Surgical Oncology:  -No indication for preoperative indicated genetic testing.  -s/p right breast Manda guided lumpectomy with lymph node biopsy 4/2022.   -Annual mammogram due 1/2022.   -Follow up 2/2022.     (2) Medical Oncology:  -Appointment with Dr. Brisoce being scheduled.  -Oncotype Dx pending.    (3) Radiation Oncology:  -Referral to [place] radiation oncology.    (4) Pulmonary nodules     Referring Provider: No ref. provider found    Chief Complaint: abnormal breast imaging    History of Present Illness: Ms. Fatoumata Tran is a 61 y.o. year old lady, seen at the request of No ref. provider found for a new diagnosis of right breast cancer.      This was initially detected as an imaging abnormality. She has had annual mammograms each year. She has a prior history of an abnormal mammogram on the right side that required her to have a diagnostic mammogram and ultrasound, which was normal. Her work-up is detailed in the oncologic history below.     She denies any breast lumps, pain, skin changes, or nipple discharge. She has a family history of breast cancer in her maternal aunt and cousin. She denies any family history of ovarian cancer.      4/13/2022 she presents today for follow-up.  She is doing well.  She is having some discomfort at her right axillary incision but otherwise okay.  She is already on gabapentin at home. No swelling.     Workup of Current Diagnosis:    01/07/2022 Bilateral Screening Mammogram:  There is a focal asymmetry in the posterior one third of the lower inner right breast at 5 o'clock.  BI-RADS Category 0: Incomplete.    02/02/2022 Right Breast Diagnostic Mammogram with  Ultrasound:   There is a focal asymmetry with architectural distortion in the right breast at 5 o'clock, 4 cm from the nipple. On ultrasound, there is a 9 x 6 x 9 mm mass. Suspicious, ultrasound-guided biopsy is recommended.  BI-RADS Category 4c: Suspicious abnormality.    02/14/2022 Right Breast Ultrasound-Guided Biopsy:   The right breast at 5 o'clock was imaged and the mass was localized. Ten cores were taken and a mini cork tissue marker was placed.    02/14/2022 Pathology:   Right breast, 5 o'clock, ultrasound-guided biopsy:  Invasive ductal carcinoma, grade 1. 5.5 mm largest focus. ER+; MD+: HER2-; Ki-67, 13 percent.     3/2/2022 Bilateral Breast MRI  IMPRESSION AND RECOMMENDATION:  1.  Segmental 3.8 cm nonmass enhancement at 6:00 in the right breast represents biopsy-proven malignancy. Surgical management is recommended.   2.  No MRI evidence of malignancy in the left breast.  3.  Suspected bilateral mediastinal and hilar lymphadenopathy and incompletely assessed 1.3 cm nodular area of enhancement in the anterior right lung. Recommend further evaluation with contrast-enhanced CT chest.  BI-RADS Category 6: Known biopsy-proven malignancy    4/1/2022 right  breast wire bracketed lumpectomy with sentinel lymph node biopsy  Final Diagnosis   1. Right Breast, Double Needle-Localized Lumpectomy (32 grams):               A. INVASIVE DUCTAL CARCINOMA, Moderately differentiated: [place] Histologic Grade II/III       (tubule score = 3, nuclear score = 2, mitoses score = 1):                            1. Tumor size: 32 mm (based on the slices involved).                             2. Margins are negative for invasive carcinoma; Closest distance: Invasive carcinoma is present      0.2 mm from the lateral margin (superseded by specimen #3).                             3. Negative for lymphovascular space invasion.               B. ASSOCIATED LOW TO INTERMEDIATE GRADE DUCTAL CARCINOMA IN SITU (DCIS):                             1. Solid and Cribriform types with focal necrosis.                            2. Extent of DCIS: 8 mm (based on the blocks involved).                            3. Margins are negative for in situ carcinoma; Closest distance: DCIS is present 5 mm from the       lateral margin (superseded by specimen #3).  C. Clip and biopsy site changes are present and adjacent to invasive and in situ carcinoma.  D. See Synoptic Report (to include parts 2 - 12).     2. Right Breast, Additional Anterior Margin, Re-excision:               A. Benign breast parenchyma with no significant histopathologic changes (additional 8 mm of tissue free of        carcinoma).     3. Right Breast, Additional Lateral Margin, Re-excision:               A. Benign breast parenchyma with micropapilloma and sclerotic duct with associated calcifications      (additional 11 mm of tissue free of carcinoma).     4. Right Breast, Additional Superior Margin, Re-excision:               A. Benign breast parenchyma with no significant histopathologic changes (additional 9 mm of tissue free of        carcinoma).     5. Right Breast, Additional Inferior Margin, Re-excision:               A. Benign breast parenchyma with no significant histopathologic changes (additional 12 mm of tissue free of        carcinoma).     6. Right Breast, Additional Medial Margin, Re-excision:               A. Benign breast parenchyma and skeletal muscle with no significant histopathologic changes       (additional 7 mm of tissue free of carcinoma).     7. Right Breast, Additional Posterior Margin, Re-excision:               A. Benign breast fibroadipose tissue and skeletal muscle with no significant histopathologic changes       (additional 7 mm of tissue free of carcinoma).     8. Right Axillary [place] Lymph Node #1, Hot and Blue, 85, Biopsy:  A. Benign fibroconnective tissue with no significant histopathologic changes.  B. No lymphoid tissue is present (entire specimen  submitted for microscopic evaluation).     9. Right Axillary [place] Lymph Node #2, Hot and Blue, 1850, Biopsy:               A. One lymph node, negative for carcinoma (0/1).     10. Right Axillary [place] Lymph Node #3, Hot and Blue, 520, Biopsy:               A. Two lymph nodes, negative for carcinoma (0/2).     11. Right Axillary [place] Lymph Node #4, Palpable, Biopsy:               A. Seven lymph nodes, negative for carcinoma (0/7).     12. Right Axillary [place] Lymph Node #5, Hot and Blue, 110, Biopsy:               A. One lymph node, negative for carcinoma (0/1).     Gynecologic History:   G:3 P:2 AB:0  Age at first childbirth: 23  Lactation/How long: Not applicable.  Age at menarche: 10  Age at menopause: Late 50s  Total years of oral contraceptive use: Over 10 years  Total years of hormone replacement therapy: Not applicable.    Past Medical History:   • Peripheral neuropathy, bilateral feet.     Past Surgical History:    • Hysterectomy  • Total knee arthroscopy    Family History:    • As above.     Social History:  • Denies tobacco use  • Occasional alcohol use    Allergies:   Allergies   Allergen Reactions   • Penicillins Hives, Itching and Rash   • Sulfa Antibiotics Hives, Itching and Rash     Medications:     Current Outpatient Medications:   •  ALPRAZolam (XANAX) 0.5 MG tablet, Take 0.5 mg by mouth 2 (Two) Times a Day As Needed., Disp: , Rfl:   •  esomeprazole (nexIUM) 20 MG capsule, Take 20 mg by mouth Every Morning Before Breakfast., Disp: , Rfl:   •  gabapentin (NEURONTIN) 600 MG tablet, 600 mg 3 (Three) Times a Day., Disp: , Rfl:   •  HYDROcodone-acetaminophen (Norco) 5-325 MG per tablet, Take 1 tablet by mouth Every 6 (Six) Hours As Needed for Mild Pain ., Disp: 8 tablet, Rfl: 0  •  HYDROcodone-acetaminophen (Norco) 5-325 MG per tablet, 1-2 by mouth every four hours as needed for pain, Disp: 24 tablet, Rfl: 0  •  levothyroxine (SYNTHROID, LEVOTHROID) 88 MCG tablet, Take 88 mcg by mouth  Daily., Disp: , Rfl:     Laboratory Values:    Labs from 2022 reviewed.    Review of Systems:   Influenza-like illness: no fever, no  cough, no  sore throat, no  body aches, no loss of sense of taste or smell, no known exposure to person with Covid-19.  Constitutional: Negative for fevers or chills  HENT: Negative for hearing loss or runny nose  Eyes: Negative for vision changes or scleral icterus  Respiratory: Negative for cough or shortness of breath  Cardiovascular: Negative for chest pain or heart palpitations  Gastrointestinal: Negative for abdominal pain, nausea, vomiting, constipation, melena, or hematochezia  Genitourinary: Negative for hematuria or dysuria  Musculoskeletal: Negative for joint swelling or gait instability  Neurologic: Negative for tremors or seizures  Psychiatric: Negative for suicidal ideations or depression  All other systems reviewed and negative    Physical Exam:   • ECO - Symptomatic but completely ambulatory  • Constitutional: Well-developed well-nourished, no acute distress  • Eyes: Conjunctiva normal, sclera nonicteric  • ENMT: Hearing grossly normal, oral mucosa moist  • Neck: Supple, trachea midline  • Respiratory: No increased work of breathing, normal inspiratory effort  • Cardiovascular: Regular rate, no peripheral edema, no jugular venous distention  • Breast: symmetric.  o Right: No visible abnormalities on inspection while seated, with arms raised or hands on hips. No masses, skin changes, or nipple abnormalities. Periareolar incision clean and dry with no erythema or drainage, axillary incision with no evidence of infection or seroma.  o Left: No visible abnormalities on inspection while seated, with arms raised or hands on hips. No masses, skin changes, or nipple abnormalities.  o No clinical chest wall involvement.  • Gastrointestinal: Soft, nontender  • Lymphatics (palpable nodes): No cervical, supraclavicular or axillary lymphadenopathy  • Skin:  Warm, dry, no  rash on visualized skin surfaces  • Musculoskeletal: Symmetric strength, normal gait  • Psychiatric: Alert and oriented ×3, normal affect     JAG SMITH M.D.  General and Endoscopic Surgery  [facility]    [address], [address]  [place], [place], [address]  P: [phone]  F: [phone]

## 2022-04-15 ENCOUNTER — NURSE NAVIGATOR (OUTPATIENT)
Dept: OTHER | Facility: HOSPITAL | Age: 62
End: 2022-04-15

## 2022-04-15 NOTE — PROGRESS NOTES
Called Ms. Tran to see how she was doing. She stated she is still in pain but is massaging the area like Dr. Pinto instructed and will see the PT in the near future. She had questions about her referral to Dr. Briscoe and discussed this with Dr. Pinto's office. They will send the referral again. Otherwise she has no needs at this time and is doing well. She was thankful for the call and will reach out if any questions or needs arise.

## 2022-04-22 LAB
LAB AP CASE REPORT: NORMAL
LAB AP SYNOPTIC CHECKLIST: NORMAL
Lab: NORMAL
PATH REPORT.ADDENDUM SPEC: NORMAL
PATH REPORT.FINAL DX SPEC: NORMAL
PATH REPORT.GROSS SPEC: NORMAL

## 2022-05-30 ENCOUNTER — NURSE NAVIGATOR (OUTPATIENT)
Dept: OTHER | Facility: HOSPITAL | Age: 62
End: 2022-05-30

## 2022-05-30 NOTE — PROGRESS NOTES
Called Ms. Tran to see how she was doing. She stated she is doing well. She had a consult with radiation onc at a different facility and is set to start treatments soon. Otherwise. She has no needs at this time. She was thankful for the call and will reach out if any questions or needs arise.

## 2022-07-06 ENCOUNTER — NURSE NAVIGATOR (OUTPATIENT)
Dept: OTHER | Facility: HOSPITAL | Age: 62
End: 2022-07-06

## 2022-07-06 NOTE — PROGRESS NOTES
Called Ms. Tran to see how radiation went. She stated her radiation burns were horrible and the pain was difficult to manage. She had radiation at an outside facility and was instructed to put cream on it daily. She stated she has been doing that and knows it will take time to heal. Otherwise she is doing well and has no needs. We briefly discussed our survivorship clinic and she was interested in getting more information. Will mail out survivorship information. She was thankful for the call and will reach out if any needs arise in the future.

## 2022-09-29 ENCOUNTER — TELEPHONE (OUTPATIENT)
Dept: SURGERY | Facility: CLINIC | Age: 62
End: 2022-09-29

## 2022-09-29 NOTE — TELEPHONE ENCOUNTER
Patient called with having some concerns of her right breast that  She had a lumpectomy on back in April. She has some pain around her nipple and between both breast for about 2-3 weeks  she said it feels like it is bruised but there is no discoloring in the area no redness or anything of that sort, she didn't feel any knots or anything in the breast either. She has an appointment to see Dr. Briscoe on 10/13/22 and didn't know if she needed to wait and let him know what was going on. She just wanted to make sure it was nothing to worry about.

## (undated) DEVICE — APPL CHLORAPREP HI/LITE 26ML ORNG

## (undated) DEVICE — ANTIBACTERIAL UNDYED BRAIDED (POLYGLACTIN 910), SYNTHETIC ABSORBABLE SUTURE: Brand: COATED VICRYL

## (undated) DEVICE — PK UNIV COMPL 40

## (undated) DEVICE — NDL HYPO ECLPS SFTY 22G 1 1/2IN

## (undated) DEVICE — TRAP FLD MINIVAC MEGADYNE 100ML

## (undated) DEVICE — TBG PENCL TELESCP MEGADYNE SMOKE EVAC 10FT

## (undated) DEVICE — LEGGINGS, PAIR, 31X48, STERILE: Brand: MEDLINE

## (undated) DEVICE — ADHS SKIN SURG TISS VISC PREMIERPRO EXOFIN HI/VISC FAST/DRY

## (undated) DEVICE — SUT MNCRYL PLS ANTIB UD 4/0 PS2 18IN

## (undated) DEVICE — STCKNT IMPERV 9X36IN STRL

## (undated) DEVICE — SYR LUERLOK 5CC

## (undated) DEVICE — NDL HYPO PRECISIONGLIDE REG 25G 1 1/2

## (undated) DEVICE — SUT SILK 2/0 FS BLK 18IN 685G

## (undated) DEVICE — GLV SURG BIOGEL LTX PF 6 1/2

## (undated) DEVICE — CVR TRANSD CIV FLX TPR 11.9 TO 3.8X61CM